# Patient Record
Sex: MALE | Race: WHITE | Employment: OTHER | URBAN - METROPOLITAN AREA
[De-identification: names, ages, dates, MRNs, and addresses within clinical notes are randomized per-mention and may not be internally consistent; named-entity substitution may affect disease eponyms.]

---

## 2018-02-06 ENCOUNTER — TRANSFERRED RECORDS (OUTPATIENT)
Dept: HEALTH INFORMATION MANAGEMENT | Facility: CLINIC | Age: 38
End: 2018-02-06

## 2018-02-06 ENCOUNTER — APPOINTMENT (OUTPATIENT)
Dept: CT IMAGING | Facility: CLINIC | Age: 38
End: 2018-02-06
Attending: EMERGENCY MEDICINE
Payer: COMMERCIAL

## 2018-02-06 ENCOUNTER — HOSPITAL ENCOUNTER (EMERGENCY)
Facility: CLINIC | Age: 38
Discharge: HOME | End: 2018-02-06
Attending: EMERGENCY MEDICINE | Admitting: EMERGENCY MEDICINE
Payer: COMMERCIAL

## 2018-02-06 VITALS
RESPIRATION RATE: 16 BRPM | DIASTOLIC BLOOD PRESSURE: 80 MMHG | SYSTOLIC BLOOD PRESSURE: 131 MMHG | OXYGEN SATURATION: 99 % | HEIGHT: 69 IN | BODY MASS INDEX: 25.92 KG/M2 | WEIGHT: 175 LBS | TEMPERATURE: 98.3 F

## 2018-02-06 DIAGNOSIS — I82.4Z2 ACUTE DEEP VEIN THROMBOSIS (DVT) OF DISTAL VEIN OF LEFT LOWER EXTREMITY (H): ICD-10-CM

## 2018-02-06 LAB
ANION GAP SERPL CALCULATED.3IONS-SCNC: 8 MMOL/L (ref 3–14)
BASOPHILS # BLD AUTO: 0.1 10E9/L (ref 0–0.2)
BASOPHILS NFR BLD AUTO: 0.4 %
BUN SERPL-MCNC: 17 MG/DL (ref 7–30)
CALCIUM SERPL-MCNC: 8.9 MG/DL (ref 8.5–10.1)
CHLORIDE SERPL-SCNC: 106 MMOL/L (ref 94–109)
CO2 SERPL-SCNC: 25 MMOL/L (ref 20–32)
CREAT BLD-MCNC: 0.8 MG/DL (ref 0.66–1.25)
CREAT SERPL-MCNC: 0.7 MG/DL (ref 0.66–1.25)
DIFFERENTIAL METHOD BLD: ABNORMAL
EOSINOPHIL # BLD AUTO: 0.3 10E9/L (ref 0–0.7)
EOSINOPHIL NFR BLD AUTO: 2.3 %
ERYTHROCYTE [DISTWIDTH] IN BLOOD BY AUTOMATED COUNT: 12.8 % (ref 10–15)
GFR SERPL CREATININE-BSD FRML MDRD: >90 ML/MIN/1.7M2
GFR SERPL CREATININE-BSD FRML MDRD: >90 ML/MIN/1.7M2
GLUCOSE SERPL-MCNC: 101 MG/DL (ref 70–99)
HCT VFR BLD AUTO: 33.4 % (ref 40–53)
HGB BLD-MCNC: 11.3 G/DL (ref 13.3–17.7)
IMM GRANULOCYTES # BLD: 0.2 10E9/L (ref 0–0.4)
IMM GRANULOCYTES NFR BLD: 1.5 %
LYMPHOCYTES # BLD AUTO: 2.7 10E9/L (ref 0.8–5.3)
LYMPHOCYTES NFR BLD AUTO: 22.7 %
MCH RBC QN AUTO: 31 PG (ref 26.5–33)
MCHC RBC AUTO-ENTMCNC: 33.8 G/DL (ref 31.5–36.5)
MCV RBC AUTO: 92 FL (ref 78–100)
MONOCYTES # BLD AUTO: 0.9 10E9/L (ref 0–1.3)
MONOCYTES NFR BLD AUTO: 7.5 %
NEUTROPHILS # BLD AUTO: 7.8 10E9/L (ref 1.6–8.3)
NEUTROPHILS NFR BLD AUTO: 65.6 %
NRBC # BLD AUTO: 0 10*3/UL
NRBC BLD AUTO-RTO: 0 /100
PLATELET # BLD AUTO: 468 10E9/L (ref 150–450)
POTASSIUM SERPL-SCNC: 3.8 MMOL/L (ref 3.4–5.3)
RBC # BLD AUTO: 3.65 10E12/L (ref 4.4–5.9)
SODIUM SERPL-SCNC: 139 MMOL/L (ref 133–144)
WBC # BLD AUTO: 11.9 10E9/L (ref 4–11)

## 2018-02-06 PROCEDURE — 25000128 H RX IP 250 OP 636: Performed by: EMERGENCY MEDICINE

## 2018-02-06 PROCEDURE — 82565 ASSAY OF CREATININE: CPT

## 2018-02-06 PROCEDURE — 25000125 ZZHC RX 250: Performed by: EMERGENCY MEDICINE

## 2018-02-06 PROCEDURE — 85025 COMPLETE CBC W/AUTO DIFF WBC: CPT | Performed by: EMERGENCY MEDICINE

## 2018-02-06 PROCEDURE — 99285 EMERGENCY DEPT VISIT HI MDM: CPT | Mod: 25

## 2018-02-06 PROCEDURE — 71260 CT THORAX DX C+: CPT

## 2018-02-06 PROCEDURE — 80048 BASIC METABOLIC PNL TOTAL CA: CPT | Performed by: EMERGENCY MEDICINE

## 2018-02-06 RX ORDER — IOPAMIDOL 755 MG/ML
66 INJECTION, SOLUTION INTRAVASCULAR ONCE
Status: COMPLETED | OUTPATIENT
Start: 2018-02-06 | End: 2018-02-06

## 2018-02-06 RX ADMIN — SODIUM CHLORIDE 91 ML: 9 INJECTION, SOLUTION INTRAVENOUS at 15:42

## 2018-02-06 RX ADMIN — IOPAMIDOL 66 ML: 755 INJECTION, SOLUTION INTRAVENOUS at 15:40

## 2018-02-06 ASSESSMENT — ENCOUNTER SYMPTOMS: MYALGIAS: 1
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
## 2018-02-06 NOTE — ED AVS SNAPSHOT
Emergency Department    [address] [facility] [address]    Phone:  [phone]    Fax:  [phone]                                       Danielito Theodore   MRN: [id number]    Department:   Emergency Department   Date of Visit:  2/6/2018           Patient Information     Date Of Birth          1980        Your diagnoses for this visit were:     Acute deep vein thrombosis (DVT) of distal vein of left lower extremity (H)        You were seen by Black Stevens MD.      Follow-up Information     Follow up with Your primary care doctor In 5 days.        Call Channing Padilla MD.    Specialty:  Orthopedics    Contact information:    [facility]  [address] [address]  [phone]          Follow up with  Emergency Department.    Specialty:  EMERGENCY MEDICINE    Why:  As needed, If symptoms worsen    Contact information:    [address] [facility] [address] [phone]        Discharge Instructions         * Deep Vein Thrombosis    Deep Vein Thrombosis (DVT) means there is a blood clot in a deep vein of the leg. This may cause redness, swelling, warmth and pain of the leg. If the blood clot grows larger, a piece may break off and go to the lungs (pulmonary embolus)  Factors that increase risk of a DVT include: overweight, smoking, use of estrogen replacement therapy. Prolonged periods without movement (such as long distance travel, wearing a fracture cast, and prolonged bed rest after surgery or during an illness) also increases the risk of a DVT.  Home Care:    Wear compression stockings as directed by your doctor.    Move your ankles, toes and knees frequently to stimulate blood flow.    You will be prescribed a blood-thinning (anti-coagulant) medicine, either pills or shots. Take it exactly as directed.  Follow Up  Follow up with your doctor as advised. You will need regular blood tests to check your INR (International Normalized  Ratio).  Get Prompt Medical Attention  Call your doctor or 911 if any of the following occur:    Shortness of breath or painful breathing    Chest pain, repeated cough or coughing up blood    Increasing swelling or increasing pain in the leg    Spreading redness    Unexpected bleeding (nose, gums, cuts, urinary tract, vagina, rectum)    7240-0605 [employer]. [address] [place] [address]. All rights reserved. This information is not intended as a substitute for professional medical care. Always follow your healthcare professional's instructions.  This information has been modified by your health care provider with permission from the publisher.          24 Hour Appointment Hotline       To make an appointment at any [facility], call [phone] ([phone]). If you don't have a family doctor or clinic, we will help you find one. [facility] are conveniently located to serve the needs of you and your family.             Review of your medicines      START taking        Dose / Directions Last dose taken    apixaban ANTICOAGULANT 5 MG tablet   Commonly known as:  ELIQUIS   Quantity:  74 tablet        Take 2 tablets (10mg) by mouth 2 times daily for the first 7 days.  Following, take 1 tablet (5mg) 2 times daily.   Refills:  0          Our records show that you are taking the medicines listed below. If these are incorrect, please call your family doctor or clinic.        Dose / Directions Last dose taken    EPINEPHrine 0.3 MG/0.3ML injection   Commonly known as:  EPIPEN 2-BRYSON   Dose:  0.3 mg   Quantity:  1 each        Inject 0.3 mLs (0.3 mg) into the muscle once as needed for anaphylaxis   Refills:  1        predniSONE 20 MG tablet   Commonly known as:  DELTASONE   Dose:  40 mg   Quantity:  10 tablet        Take 2 tablets (40 mg) by mouth daily for 5 days Take two tablets (= 40mg) each day for 5 (five) days   Refills:  0                Prescriptions were sent or printed at  these locations (1 Prescription)                   Other Prescriptions                Printed at Department/Unit printer (1 of 1)         apixaban ANTICOAGULANT (ELIQUIS) 5 MG tablet                Procedures and tests performed during your visit     Basic metabolic panel    CBC with platelets + differential    Chest CT, IV contrast only - PE protocol    Creatinine POCT      Orders Needing Specimen Collection     None      Pending Results     No orders found from 2/4/2018 to 2/7/2018.            Pending Culture Results     No orders found from 2/4/2018 to 2/7/2018.            Pending Results Instructions     If you had any lab results that were not finalized at the time of your Discharge, you can call the ED Lab Result RN at [phone]. You will be contacted by this team for any positive Lab results or changes in treatment. The nurses are available 7 days a week from 10A to 6:30P.  You can leave a message 24 hours per day and they will return your call.        Test Results From Your Hospital Stay        2/6/2018  2:26 PM      Component Results     Component Value Ref Range & Units Status    WBC 11.9 (H) 4.0 - 11.0 10e9/L Final    RBC Count 3.65 (L) 4.4 - 5.9 10e12/L Final    Hemoglobin 11.3 (L) 13.3 - 17.7 g/dL Final    Hematocrit 33.4 (L) 40.0 - 53.0 % Final    MCV 92 78 - 100 fl Final    MCH 31.0 26.5 - 33.0 pg Final    MCHC 33.8 31.5 - 36.5 g/dL Final    RDW 12.8 10.0 - 15.0 % Final    Platelet Count 468 (H) 150 - 450 10e9/L Final    Diff Method Automated Method  Final    % Neutrophils 65.6 % Final    % Lymphocytes 22.7 % Final    % Monocytes 7.5 % Final    % Eosinophils 2.3 % Final    % Basophils 0.4 % Final    % Immature Granulocytes 1.5 % Final    Nucleated RBCs 0 0 /100 Final    Absolute Neutrophil 7.8 1.6 - 8.3 10e9/L Final    Absolute Lymphocytes 2.7 0.8 - 5.3 10e9/L Final    Absolute Monocytes 0.9 0.0 - 1.3 10e9/L Final    Absolute Eosinophils 0.3 0.0 - 0.7 10e9/L Final    Absolute Basophils 0.1 0.0 -  0.2 10e9/L Final    Abs Immature Granulocytes 0.2 0 - 0.4 10e9/L Final    Absolute Nucleated RBC 0.0  Final         2/6/2018  3:07 PM      Component Results     Component Value Ref Range & Units Status    Sodium 139 133 - 144 mmol/L Final    Potassium 3.8 3.4 - 5.3 mmol/L Final    Chloride 106 94 - 109 mmol/L Final    Carbon Dioxide 25 20 - 32 mmol/L Final    Anion Gap 8 3 - 14 mmol/L Final    Glucose 101 (H) 70 - 99 mg/dL Final    Urea Nitrogen 17 7 - 30 mg/dL Final    Creatinine 0.70 0.66 - 1.25 mg/dL Final    GFR Estimate >90 >60 mL/min/1.7m2 Final    Non  GFR Calc    GFR Estimate If Black >90 >60 mL/min/1.7m2 Final    African American GFR Calc    Calcium 8.9 8.5 - 10.1 mg/dL Final         2/6/2018  4:00 PM      Narrative     CT CHEST PULMONARY EMBOLISM WITH CONTRAST   2/6/2018 3:44 PM     HISTORY: Chest pain, positive deep vein thrombosis.    TECHNIQUE:  66 mL of Isovue-370 intravenous contrast. Radiation dose  for this scan was reduced using automated exposure control, adjustment  of the mA and/or kV according to patient size, or iterative  reconstruction technique.    COMPARISON: None.    FINDINGS:  No evidence of pulmonary embolism. No thoracic aortic  aneurysm or dissection. No pneumothorax. No pleural or pericardial  effusion. No pulmonary nodule or mass. No mediastinal, hilar, or  axillary adenopathy. Thyroid gland appears normal. Residual thymus is  noted. Visualized bones and upper abdomen are unremarkable.        Impression     IMPRESSION: No radiographic evidence of acute chest abnormality.  Specifically, there is no evidence of pulmonary embolism.    NIKOLAS LYNCH MD         2/6/2018  3:15 PM      Component Results     Component Value Ref Range & Units Status    Creatinine 0.8 0.66 - 1.25 mg/dL Final    GFR Estimate >90 >60 mL/min/1.7m2 Final    GFR Estimate If Black >90 >60 mL/min/1.7m2 Final                Clinical Quality Measure: Blood Pressure Screening     Your blood pressure  "was checked while you were in the emergency department today. The last reading we obtained was  BP: 131/80 . Please read the guidelines below about what these numbers mean and what you should do about them.  If your systolic blood pressure (the top number) is less than 120 and your diastolic blood pressure (the bottom number) is less than 80, then your blood pressure is normal. There is nothing more that you need to do about it.  If your systolic blood pressure (the top number) is 120-139 or your diastolic blood pressure (the bottom number) is 80-89, your blood pressure may be higher than it should be. You should have your blood pressure rechecked within a year by a primary care provider.  If your systolic blood pressure (the top number) is 140 or greater or your diastolic blood pressure (the bottom number) is 90 or greater, you may have high blood pressure. High blood pressure is treatable, but if left untreated over time it can put you at risk for heart attack, stroke, or kidney failure. You should have your blood pressure rechecked by a primary care provider within the next 4 weeks.  If your provider in the emergency department today gave you specific instructions to follow-up with your doctor or provider even sooner than that, you should follow that instruction and not wait for up to 4 weeks for your follow-up visit.        Thank you for choosing [facility]       Thank you for choosing [facility] for your care. Our goal is always to provide you with excellent care. Hearing back from our patients is one way we can continue to improve our services. Please take a few minutes to complete the written survey that you may receive in the mail after you visit with us. Thank you!        [employer] Information     [employer] lets you send messages to your doctor, view your test results, renew your prescriptions, schedule appointments and more. To sign up, go to [email or web address] . Click on \"Log in\" on the left side of the " "screen, which will take you to the Welcome page. Then click on \"Sign up Now\" on the right side of the page.     You will be asked to enter the access code listed below, as well as some personal information. Please follow the directions to create your username and password.     Your access code is: [email or web address]  Expires: 2018  4:05 PM     Your access code will  in 90 days. If you need help or a new code, please call your [facility] clinic or [phone].        Care EveryWhere ID     This is your Care EveryWhere ID. This could be used by other organizations to access your [facility] medical records  [id number]        Equal Access to Services     JOSE DELA CRUZ : Kwame Diaz, ernie marquis, delia lopez, apolonia dunne. So [facility] [phone].    ATENCIÓN: Si habla español, tiene a jordan disposición servicios gratuitos de asistencia lingüística. Llame al [phone].    We comply with applicable federal civil rights laws and [place] laws. We do not discriminate on the basis of race, color, national origin, age, disability, sex, sexual orientation, or gender identity.            After Visit Summary       This is your record. Keep this with you and show to your community pharmacist(s) and doctor(s) at your next visit.                  "

## 2018-02-06 NOTE — ED AVS SNAPSHOT
Emergency Department    [address] [place] [address]    Phone:  [phone]    Fax:  [phone]                                       Danielito Theodore   MRN: [id number]    Department:   Emergency Department   Date of Visit:  2/6/2018           After Visit Summary Signature Page     I have received my discharge instructions, and my questions have been answered. I have discussed any challenges I see with this plan with the nurse or doctor.    ..........................................................................................................................................  Patient/Patient Representative Signature      ..........................................................................................................................................  Patient Representative Print Name and Relationship to Patient    ..................................................               ................................................  Date                                            Time    ..........................................................................................................................................  Reviewed by Signature/Title    ...................................................              ..............................................  Date                                                            Time

## 2021-08-26 ENCOUNTER — HOSPITAL ENCOUNTER (EMERGENCY)
Facility: CLINIC | Age: 41
Discharge: HOME | End: 2021-08-26
Attending: NURSE PRACTITIONER | Admitting: NURSE PRACTITIONER
Payer: COMMERCIAL

## 2021-08-26 VITALS
WEIGHT: 175 LBS | TEMPERATURE: 98.1 F | BODY MASS INDEX: 25.92 KG/M2 | SYSTOLIC BLOOD PRESSURE: 104 MMHG | HEART RATE: 61 BPM | DIASTOLIC BLOOD PRESSURE: 65 MMHG | HEIGHT: 69 IN | RESPIRATION RATE: 12 BRPM | OXYGEN SATURATION: 97 %

## 2021-08-26 DIAGNOSIS — T63.441A BEE STING REACTION: ICD-10-CM

## 2021-08-26 DIAGNOSIS — T78.2XXA ANAPHYLAXIS, INITIAL ENCOUNTER: ICD-10-CM

## 2021-08-26 PROCEDURE — 250N000009 HC RX 250

## 2021-08-26 PROCEDURE — 96361 HYDRATE IV INFUSION ADD-ON: CPT

## 2021-08-26 PROCEDURE — 250N000009 HC RX 250: Performed by: NURSE PRACTITIONER

## 2021-08-26 PROCEDURE — 258N000003 HC RX IP 258 OP 636: Performed by: NURSE PRACTITIONER

## 2021-08-26 PROCEDURE — 99285 EMERGENCY DEPT VISIT HI MDM: CPT | Mod: 25

## 2021-08-26 PROCEDURE — 96375 TX/PRO/DX INJ NEW DRUG ADDON: CPT

## 2021-08-26 PROCEDURE — 250N000011 HC RX IP 250 OP 636: Performed by: NURSE PRACTITIONER

## 2021-08-26 PROCEDURE — 96372 THER/PROPH/DIAG INJ SC/IM: CPT

## 2021-08-26 PROCEDURE — 96374 THER/PROPH/DIAG INJ IV PUSH: CPT

## 2021-08-26 RX ORDER — EPINEPHRINE 0.3 MG/.3ML
0.3 INJECTION SUBCUTANEOUS ONCE
Qty: 0.6 ML | Refills: 0 | Status: SHIPPED | OUTPATIENT
Start: 2021-08-26 | End: 2021-08-26

## 2021-08-26 RX ORDER — METHYLPREDNISOLONE SODIUM SUCCINATE 125 MG/2ML
125 INJECTION, POWDER, LYOPHILIZED, FOR SOLUTION INTRAMUSCULAR; INTRAVENOUS ONCE
Status: COMPLETED | OUTPATIENT
Start: 2021-08-26 | End: 2021-08-26

## 2021-08-26 RX ORDER — PREDNISONE 20 MG/1
40 TABLET ORAL DAILY
Qty: 10 TABLET | Refills: 0 | Status: SHIPPED | OUTPATIENT
Start: 2021-08-26 | End: 2021-08-31

## 2021-08-26 RX ORDER — DIPHENHYDRAMINE HYDROCHLORIDE 50 MG/ML
50 INJECTION INTRAMUSCULAR; INTRAVENOUS ONCE
Status: COMPLETED | OUTPATIENT
Start: 2021-08-26 | End: 2021-08-26

## 2021-08-26 RX ORDER — DIPHENHYDRAMINE HYDROCHLORIDE 50 MG/ML
INJECTION INTRAMUSCULAR; INTRAVENOUS
Status: DISCONTINUED
Start: 2021-08-26 | End: 2021-08-27 | Stop reason: HOSPADM

## 2021-08-26 RX ADMIN — DIPHENHYDRAMINE HYDROCHLORIDE 50 MG: 50 INJECTION INTRAMUSCULAR; INTRAVENOUS at 18:23

## 2021-08-26 RX ADMIN — EPINEPHRINE 1 MG: 1 INJECTION INTRAMUSCULAR; INTRAVENOUS; SUBCUTANEOUS at 18:17

## 2021-08-26 RX ADMIN — FAMOTIDINE 20 MG: 10 INJECTION, SOLUTION INTRAVENOUS at 18:24

## 2021-08-26 RX ADMIN — METHYLPREDNISOLONE SODIUM SUCCINATE 125 MG: 125 INJECTION, POWDER, FOR SOLUTION INTRAMUSCULAR; INTRAVENOUS at 18:24

## 2021-08-26 RX ADMIN — SODIUM CHLORIDE 1000 ML: 9 INJECTION, SOLUTION INTRAVENOUS at 18:26

## 2021-08-26 ASSESSMENT — ENCOUNTER SYMPTOMS
WHEEZING: 0
COLOR CHANGE: 1
TROUBLE SWALLOWING: 0
STRIDOR: 0
VOICE CHANGE: 0
FACIAL SWELLING: 1
VOMITING: 0
SHORTNESS OF BREATH: 0
NAUSEA: 0

## 2021-08-26 ASSESSMENT — MIFFLIN-ST. JEOR: SCORE: 1699.17
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
## 2021-08-27 NOTE — DISCHARGE INSTRUCTIONS
Continue to take 1 to 2 tablets of Benadryl 3 times a day for the next 48 hours.  Take steroids as directed and make sure and finish the entire course.  I have given you new prescription for EpiPen's as well.  If you develop any recurrent symptoms return to the emergency department immediately.  If you have difficulty breathing call 911.